# Patient Record
Sex: FEMALE | Race: WHITE | NOT HISPANIC OR LATINO | Employment: OTHER | URBAN - METROPOLITAN AREA
[De-identification: names, ages, dates, MRNs, and addresses within clinical notes are randomized per-mention and may not be internally consistent; named-entity substitution may affect disease eponyms.]

---

## 2017-01-12 ENCOUNTER — APPOINTMENT (OUTPATIENT)
Dept: AUDIOLOGY | Age: 82
End: 2017-01-12
Payer: MEDICARE

## 2017-01-12 PROCEDURE — V5255 HEARING AID, DIGIT, MON, ITC: HCPCS | Performed by: AUDIOLOGIST

## 2017-01-12 PROCEDURE — V5241 DISPENSING FEE, MONAURAL: HCPCS | Performed by: AUDIOLOGIST

## 2017-05-10 ENCOUNTER — APPOINTMENT (OUTPATIENT)
Dept: LAB | Facility: CLINIC | Age: 82
End: 2017-05-10
Payer: MEDICARE

## 2017-05-10 ENCOUNTER — TRANSCRIBE ORDERS (OUTPATIENT)
Dept: LAB | Facility: CLINIC | Age: 82
End: 2017-05-10

## 2017-05-10 DIAGNOSIS — I10 ESSENTIAL HYPERTENSION, BENIGN: ICD-10-CM

## 2017-05-10 DIAGNOSIS — I10 ESSENTIAL HYPERTENSION, BENIGN: Primary | ICD-10-CM

## 2017-05-10 LAB
ANION GAP SERPL CALCULATED.3IONS-SCNC: 7 MMOL/L (ref 4–13)
BUN SERPL-MCNC: 24 MG/DL (ref 5–25)
CALCIUM SERPL-MCNC: 9.6 MG/DL (ref 8.3–10.1)
CHLORIDE SERPL-SCNC: 105 MMOL/L (ref 100–108)
CO2 SERPL-SCNC: 30 MMOL/L (ref 21–32)
CREAT SERPL-MCNC: 1.13 MG/DL (ref 0.6–1.3)
GFR SERPL CREATININE-BSD FRML MDRD: 45.1 ML/MIN/1.73SQ M
GLUCOSE P FAST SERPL-MCNC: 88 MG/DL (ref 65–99)
POTASSIUM SERPL-SCNC: 4 MMOL/L (ref 3.5–5.3)
SODIUM SERPL-SCNC: 142 MMOL/L (ref 136–145)

## 2017-05-10 PROCEDURE — 80048 BASIC METABOLIC PNL TOTAL CA: CPT

## 2017-05-10 PROCEDURE — 36415 COLL VENOUS BLD VENIPUNCTURE: CPT

## 2017-06-28 ENCOUNTER — TRANSCRIBE ORDERS (OUTPATIENT)
Dept: ADMINISTRATIVE | Facility: HOSPITAL | Age: 82
End: 2017-06-28

## 2017-06-28 DIAGNOSIS — E04.1 THYROID NODULE: ICD-10-CM

## 2017-06-28 DIAGNOSIS — E04.9 ENLARGED THYROID: Primary | ICD-10-CM

## 2017-06-30 ENCOUNTER — HOSPITAL ENCOUNTER (OUTPATIENT)
Dept: RADIOLOGY | Facility: HOSPITAL | Age: 82
Discharge: HOME | End: 2017-06-30
Attending: INTERNAL MEDICINE
Payer: MEDICARE

## 2017-06-30 ENCOUNTER — GENERIC CONVERSION - ENCOUNTER (OUTPATIENT)
Dept: OTHER | Facility: OTHER | Age: 82
End: 2017-06-30

## 2017-06-30 DIAGNOSIS — E04.1 THYROID NODULE: ICD-10-CM

## 2017-06-30 PROCEDURE — 70490 CT SOFT TISSUE NECK W/O DYE: CPT

## 2018-02-22 ENCOUNTER — OFFICE VISIT (OUTPATIENT)
Dept: AUDIOLOGY | Age: 83
End: 2018-02-22

## 2018-02-22 DIAGNOSIS — H90.3 SENSORY HEARING LOSS, BILATERAL: Primary | ICD-10-CM

## 2018-02-22 NOTE — PROGRESS NOTES
Hearing aid visit:    Name:  Natanael Reyes  :  10/11/1925  Age:  80 y o  Date of Evaluation: 18     Natanael Reyes is being seen for a hearing aid visit  Natanael Reyes   is fit in the left ear only  with Charlett Skipper 2 Pro custom HS hearing aid serial number [id number]  Warranty expires 2019  Patient reports no sound coming from hearing aid  She states hearing aid fell out of her ear on two separate occasions  Action:  Otoscopy revealed the left ear canal was occluded with cerumen  Hearing aids were cleaned, wax guards and microphone covers changed, battery replaced  Listening check revealed hearing aid was in good working order  Patient was counseled on proper insertion and removal of hearing aid, and was given several opportunities to practice inserting the hearing aid  Recommendations:    Ear Cleaning, Consistent Use of Amplification, Follow-up visit in 3 weeks      Tristan Harley, Audiology Intern  Uriel Chambers, MSc, [facility]

## 2019-03-27 ENCOUNTER — OFFICE VISIT (OUTPATIENT)
Dept: AUDIOLOGY | Age: 84
End: 2019-03-27
Payer: COMMERCIAL

## 2019-03-27 DIAGNOSIS — H90.3 SENSORINEURAL HEARING LOSS, BILATERAL: Primary | ICD-10-CM

## 2019-03-27 NOTE — PROGRESS NOTES
Hearing Aid Visit:    Name:  Mirta Mcintyre  :  10/11/1925  Age:  80 y o  Date of Evaluation: 19     Mirta Mcintyre is being seen for a hearing aid visit  Mirta Mcintyre   is fit binaurally with Paul Carvalho 2 Pro HS hearing aid(s) serial number [id number] right/ serial number [id number] left  Warranty 19  Brien Trujillo was accompanied to today's appointment by her daughter  Brien Trujillo only brought her right hearing aid and reports that it is not working  She reports that she does not hear the start up estefanía  Action:  Cleaned and checked hearing aid  Replaced filter  Listening check revealed start up estefanía and good sound quality  Reviewed how to change wax filters and gave 3 packs  Pt and daughter made aware of out of warranty visit  Pt paid $30     Recommendations:   New hearing test is recommended and pt's daughter will call to schedule          Urvashi Mauricio , CCC-A  Clinical Audiologist

## 2019-09-18 ENCOUNTER — TELEPHONE (OUTPATIENT)
Dept: INTERNAL MEDICINE CLINIC | Facility: CLINIC | Age: 84
End: 2019-09-18

## 2019-09-18 NOTE — TELEPHONE ENCOUNTER
Manny Bennett a physical therapist from [facility] called and requested if an order can be placed for PT eval / treat for the patient  Her fax # is [phone]

## 2019-09-18 NOTE — TELEPHONE ENCOUNTER
This is not 1 of our patients please call them back and let the therapist know that Dr Alondra Mccall is her physician thank you

## 2019-12-13 ENCOUNTER — DOCUMENTATION (OUTPATIENT)
Dept: AUDIOLOGY | Age: 84
End: 2019-12-13

## 2019-12-13 NOTE — PROGRESS NOTES
Progress Note    Name:  Kindra Mccurdy  :  10/11/1925  Age:  80 y o  Date of Evaluation: 19     Scanned in HA chart         Urvashi Tolbert , CCC-A  Clinical Audiologist

## 2023-01-05 NOTE — RESULT NOTES
Regarding: Dizziness , lightheadedness  ----- Message from Troydanaapple Morejon sent at 1/5/2023  9:56 AM CST -----  Patient Name: Kuldip Sr    Specialist or PCP Name: Tay Byrnes     Symptoms:Dizziness , lightheadedness     Pregnant (females aged 13-60. If Yes, how long?) : NO     Call Back # : [phone]    Which State are you currently located in?: [place]     Name of Clinic Site / Acct# : [facility]    Use following scripting for patients waiting for a callback:   \"Nurse callback times vary based on call volumes; please be aware the return phone call may come from an unidentified phone number. If your symptoms worsen or become life threatening while waiting, you should seek immediate assistance by calling 911 or going to the ER for evaluation.\"     Verified Results  CT SOFT TISSUE NECK WO CONTRAST [id number] 01:28PM Tomas Mcnamara     Test Name Result Flag Reference   CT SOFT TISSUE NECK WO CONTRAST (Report)     CT SOFT TISSUE NECK WITHOUT CONTRAST     INDICATION: Thyroid enlargement  COMPARISON: None  TECHNIQUE: Contiguous 2 5 mm images were obtained through the neck  In addition, sagittal and coronal reformatted images were submitted for interpretation  Radiation dose length product (DLP) for this visit: 590 15 mGy-cm   This examination, like all CT scans performed in the [facility], was performed utilizing techniques to minimize radiation dose exposure, including the use of iterative   reconstruction and automated exposure control  IMAGE QUALITY: Diagnostic  FINDINGS:     VISUALIZED BRAIN PARENCHYMA: Normal visualized brain parenchyma  VISUALIZED ORBITS AND PARANASAL SINUSES: Normal      NASAL CAVITY AND NASOPHARYNX: Mild prominence of the nasopharyngeal mucosa roughly bilaterally symmetric  No discrete mass  Direct visualization recommended  SUPRAHYOID NECK: Normal oropharynx and oral cavity  Normal parapharyngeal and retropharyngeal spaces  Normal tonsillar tissue and epiglottis  Normal infratemporal space  INFRAHYOID NECK: Aryepiglottic folds and piriform sinuses  Normal larynx and subglottic airway  THYROID GLAND: Thyroid gland is enlarged and diffusely heterogeneous with multiple nodules including solid and cystic components  PAROTID AND SUBMANDIBULAR GLANDS: Normal      LYMPH NODES: No pathologic or enlarged adenopathy  VASCULAR STRUCTURES: Vascular calcification of the common carotid artery bifurcation bilaterally, right greater than left  THORACIC INLET: Lung apices and upper mediastinum are unremarkable       BONY STRUCTURES: Mild diffuse cervical spondylitic degenerative change from C3-4 through C6-7 with mild endplate, uncinate and facet hypertrophic degenerative change  IMPRESSION:     The thyroid gland is enlarged and diffusely heterogeneous with multiple nodules  Complete evaluation of thyroid nodules should include thyroid ultrasound which describes specific criteria for possible biopsy of thyroid nodules  Therefore thyroid    ultrasound is recommended  There is prominence of the nasopharyngeal mucosa and adenoidal tissue which is nonspecific  Consider ENT consultation and direct visual inspection  No adenopathy identified within the neck         ##[id number]##sigslh       Workstation performed: [id number]     Signed by:   Kathy Eng DO   7/3/17